# Patient Record
Sex: MALE | Race: BLACK OR AFRICAN AMERICAN | Employment: FULL TIME | URBAN - METROPOLITAN AREA
[De-identification: names, ages, dates, MRNs, and addresses within clinical notes are randomized per-mention and may not be internally consistent; named-entity substitution may affect disease eponyms.]

---

## 2019-11-16 ENCOUNTER — APPOINTMENT (OUTPATIENT)
Dept: GENERAL RADIOLOGY | Age: 29
End: 2019-11-16
Payer: COMMERCIAL

## 2019-11-16 ENCOUNTER — HOSPITAL ENCOUNTER (EMERGENCY)
Age: 29
Discharge: HOME | End: 2019-11-16
Attending: EMERGENCY MEDICINE
Payer: COMMERCIAL

## 2019-11-16 VITALS
DIASTOLIC BLOOD PRESSURE: 80 MMHG | TEMPERATURE: 98.1 F | OXYGEN SATURATION: 99 % | WEIGHT: 315 LBS | SYSTOLIC BLOOD PRESSURE: 149 MMHG | HEIGHT: 72 IN | BODY MASS INDEX: 42.66 KG/M2 | RESPIRATION RATE: 18 BRPM | HEART RATE: 84 BPM

## 2019-11-16 DIAGNOSIS — G89.29 CHRONIC RIGHT SHOULDER PAIN: Primary | ICD-10-CM

## 2019-11-16 DIAGNOSIS — M25.511 CHRONIC RIGHT SHOULDER PAIN: Primary | ICD-10-CM

## 2019-11-16 PROCEDURE — 99283 EMERGENCY DEPT VISIT LOW MDM: CPT

## 2019-11-16 PROCEDURE — 73030 X-RAY EXAM OF SHOULDER: CPT

## 2019-11-16 RX ORDER — OMEPRAZOLE 10 MG/1
10 CAPSULE, DELAYED RELEASE ORAL DAILY
COMMUNITY

## 2019-11-16 ASSESSMENT — PAIN DESCRIPTION - PAIN TYPE: TYPE: ACUTE PAIN

## 2019-11-16 ASSESSMENT — PAIN DESCRIPTION - ORIENTATION: ORIENTATION: RIGHT

## 2019-11-16 ASSESSMENT — PAIN DESCRIPTION - PROGRESSION: CLINICAL_PROGRESSION: GRADUALLY WORSENING

## 2019-11-16 ASSESSMENT — PAIN SCALES - GENERAL
PAINLEVEL_OUTOF10: 5
PAINLEVEL_OUTOF10: 5

## 2019-11-16 ASSESSMENT — PAIN DESCRIPTION - LOCATION: LOCATION: SHOULDER

## 2019-11-16 ASSESSMENT — PAIN DESCRIPTION - DESCRIPTORS: DESCRIPTORS: SHARP

## 2019-11-16 ASSESSMENT — PAIN DESCRIPTION - FREQUENCY: FREQUENCY: INTERMITTENT

## 2020-02-02 ENCOUNTER — HOSPITAL ENCOUNTER (EMERGENCY)
Age: 30
Discharge: HOME | End: 2020-02-02
Payer: COMMERCIAL

## 2020-02-02 VITALS
RESPIRATION RATE: 16 BRPM | WEIGHT: 315 LBS | HEIGHT: 72 IN | SYSTOLIC BLOOD PRESSURE: 144 MMHG | BODY MASS INDEX: 42.66 KG/M2 | DIASTOLIC BLOOD PRESSURE: 80 MMHG | OXYGEN SATURATION: 98 % | HEART RATE: 89 BPM | TEMPERATURE: 99.5 F

## 2020-02-02 LAB
ALBUMIN SERPL-MCNC: 3.6 G/DL (ref 3.4–5)
ALP BLD-CCNC: 45 U/L (ref 40–129)
ALT SERPL-CCNC: 21 U/L (ref 10–40)
ANION GAP SERPL CALCULATED.3IONS-SCNC: 14 MMOL/L (ref 3–16)
AST SERPL-CCNC: 21 U/L (ref 15–37)
BASOPHILS ABSOLUTE: 0 K/UL (ref 0–0.2)
BASOPHILS RELATIVE PERCENT: 0.6 %
BILIRUB SERPL-MCNC: 0.5 MG/DL (ref 0–1)
BILIRUBIN DIRECT: <0.2 MG/DL (ref 0–0.3)
BILIRUBIN, INDIRECT: ABNORMAL MG/DL (ref 0–1)
BUN BLDV-MCNC: 10 MG/DL (ref 7–20)
CALCIUM SERPL-MCNC: 9 MG/DL (ref 8.3–10.6)
CHLORIDE BLD-SCNC: 104 MMOL/L (ref 99–110)
CO2: 23 MMOL/L (ref 21–32)
CREAT SERPL-MCNC: 1 MG/DL (ref 0.9–1.3)
EOSINOPHILS ABSOLUTE: 0 K/UL (ref 0–0.6)
EOSINOPHILS RELATIVE PERCENT: 0.5 %
GFR AFRICAN AMERICAN: >60
GFR NON-AFRICAN AMERICAN: >60
GLUCOSE BLD-MCNC: 104 MG/DL (ref 70–99)
HCT VFR BLD CALC: 42.1 % (ref 40.5–52.5)
HEMOGLOBIN: 14.5 G/DL (ref 13.5–17.5)
LIPASE: 26 U/L (ref 13–60)
LYMPHOCYTES ABSOLUTE: 0.8 K/UL (ref 1–5.1)
LYMPHOCYTES RELATIVE PERCENT: 10.6 %
MCH RBC QN AUTO: 28.4 PG (ref 26–34)
MCHC RBC AUTO-ENTMCNC: 34.4 G/DL (ref 31–36)
MCV RBC AUTO: 82.6 FL (ref 80–100)
MONOCYTES ABSOLUTE: 0.9 K/UL (ref 0–1.3)
MONOCYTES RELATIVE PERCENT: 12.5 %
NEUTROPHILS ABSOLUTE: 5.7 K/UL (ref 1.7–7.7)
NEUTROPHILS RELATIVE PERCENT: 75.8 %
PDW BLD-RTO: 15.6 % (ref 12.4–15.4)
PLATELET # BLD: 228 K/UL (ref 135–450)
PMV BLD AUTO: 9.7 FL (ref 5–10.5)
POTASSIUM SERPL-SCNC: 3.6 MMOL/L (ref 3.5–5.1)
RAPID INFLUENZA  B AGN: POSITIVE
RAPID INFLUENZA A AGN: NEGATIVE
RBC # BLD: 5.1 M/UL (ref 4.2–5.9)
SODIUM BLD-SCNC: 141 MMOL/L (ref 136–145)
TOTAL PROTEIN: 6.2 G/DL (ref 6.4–8.2)
WBC # BLD: 7.5 K/UL (ref 4–11)

## 2020-02-02 PROCEDURE — 80048 BASIC METABOLIC PNL TOTAL CA: CPT

## 2020-02-02 PROCEDURE — 2580000003 HC RX 258: Performed by: EMERGENCY MEDICINE

## 2020-02-02 PROCEDURE — 99283 EMERGENCY DEPT VISIT LOW MDM: CPT

## 2020-02-02 PROCEDURE — 80076 HEPATIC FUNCTION PANEL: CPT

## 2020-02-02 PROCEDURE — 87804 INFLUENZA ASSAY W/OPTIC: CPT

## 2020-02-02 PROCEDURE — 85025 COMPLETE CBC W/AUTO DIFF WBC: CPT

## 2020-02-02 PROCEDURE — 83690 ASSAY OF LIPASE: CPT

## 2020-02-02 PROCEDURE — 96360 HYDRATION IV INFUSION INIT: CPT

## 2020-02-02 RX ORDER — OSELTAMIVIR PHOSPHATE 75 MG/1
75 CAPSULE ORAL ONCE
Status: DISCONTINUED | OUTPATIENT
Start: 2020-02-02 | End: 2020-02-03 | Stop reason: HOSPADM

## 2020-02-02 RX ORDER — 0.9 % SODIUM CHLORIDE 0.9 %
1000 INTRAVENOUS SOLUTION INTRAVENOUS ONCE
Status: COMPLETED | OUTPATIENT
Start: 2020-02-02 | End: 2020-02-02

## 2020-02-02 RX ORDER — OSELTAMIVIR PHOSPHATE 75 MG/1
75 CAPSULE ORAL 2 TIMES DAILY
Qty: 10 CAPSULE | Refills: 0 | Status: SHIPPED | OUTPATIENT
Start: 2020-02-02 | End: 2020-02-07

## 2020-02-02 RX ADMIN — SODIUM CHLORIDE 1000 ML: 0.9 INJECTION, SOLUTION INTRAVENOUS at 20:50

## 2020-02-02 ASSESSMENT — ENCOUNTER SYMPTOMS
ABDOMINAL PAIN: 0
NAUSEA: 1
VOMITING: 0
SHORTNESS OF BREATH: 0
BACK PAIN: 0
WHEEZING: 0
RHINORRHEA: 0
DIARRHEA: 0
COUGH: 0
PHOTOPHOBIA: 0

## 2020-02-03 NOTE — ED PROVIDER NOTES
Emergency Department Provider Note  Location: [address] [place] [address]  2/2/2020     Patient Identification  Rosario Guerra is a 26 y.o. male    Chief Complaint  Generalized Body Aches (Pt states he's having generalized flu like symptoms but is more concerned with having the Flu. States he's not urinating a lot and is concerned with dehydration since having Vertical Sleeve Gastr. surgery at [address] [facility] ([facility]) Dec 3rd. Pt currently still on soft foods. ) and Dehydration          HPI  (History provided by patient)  Patient is a 69-year-old male history of morbid obesity status post gastric sleeve reportedly performed by Dr. Han Espinoza approximately 6 weeks ago, who presents with concerns for dehydration and \"the flu\". Patient reports he is had generalized body aches over the past 2 or so days. Nausea but no vomiting no abdominal pain no back pain no diarrhea. Felt a little lightheaded when he stood up from seated position today. Subjective fever and chills. No urinary complaints. I have reviewed the following nursing documentation:  Allergies: Allergies   Allergen Reactions    Aspirin Nausea And Vomiting       Past medical history:  has no past medical history on file. Past surgical history:  has a past surgical history that includes Tonsillectomy and Adenoidectomy. Home medications:   Prior to Admission medications    Medication Sig Start Date End Date Taking? Authorizing Provider   omeprazole (PRILOSEC) 10 MG delayed release capsule Take 10 mg by mouth daily    Historical Provider, MD   meclizine (ANTIVERT) 25 MG tablet Take 1 tablet by mouth 3 times daily as needed for Dizziness WARNING: May cause drowsiness. May impair ability to operate a motor vehicle or machinery. Do not use in combination with alcohol. 3/7/17   LEA Sandoval, DO       Social history:  reports that he has never smoked. He has never used smokeless tobacco. He reports current alcohol use.  He reports that he does not use drugs. Family history:  No family history on file. ROS  Review of Systems   Constitutional: Positive for chills and fever. HENT: Negative for congestion and rhinorrhea. Eyes: Negative for photophobia and visual disturbance. Respiratory: Negative for cough, shortness of breath and wheezing. Cardiovascular: Negative for chest pain and palpitations. Gastrointestinal: Positive for nausea. Negative for abdominal pain, diarrhea and vomiting. Genitourinary: Negative for dysuria and hematuria. Musculoskeletal: Negative for back pain and neck pain. Skin: Negative for rash and wound. Neurological: Positive for light-headedness. Negative for syncope and weakness. Psychiatric/Behavioral: Negative for agitation and confusion. Exam  ED Triage Vitals [02/02/20 2004]   BP Temp Temp Source Pulse Resp SpO2 Height Weight   (!) 144/80 99.5 °F (37.5 °C) Oral 89 16 98 % 6' (1.829 m) (!) 334 lb 6 oz (151.7 kg)       Physical Exam  Vitals signs and nursing note reviewed. Constitutional:       General: He is not in acute distress. Appearance: He is well-developed. HENT:      Head: Normocephalic and atraumatic. Nose: Nose normal.      Mouth/Throat:      Mouth: Mucous membranes are moist.      Pharynx: Oropharynx is clear. Eyes:      Extraocular Movements: Extraocular movements intact. Pupils: Pupils are equal, round, and reactive to light. Neck:      Musculoskeletal: Normal range of motion and neck supple. Cardiovascular:      Rate and Rhythm: Normal rate and regular rhythm. Heart sounds: No murmur. Pulmonary:      Effort: Pulmonary effort is normal.      Breath sounds: Normal breath sounds. Abdominal:      General: There is no distension. Palpations: Abdomen is soft. Tenderness: There is no abdominal tenderness. Musculoskeletal: Normal range of motion. General: No deformity. Skin:     General: Skin is warm. Findings: No rash.    Neurological: 3.4 - 5.0 g/dL    Alkaline Phosphatase 45 40 - 129 U/L    ALT 21 10 - 40 U/L    AST 21 15 - 37 U/L    Total Bilirubin 0.5 0.0 - 1.0 mg/dL    Bilirubin, Direct <0.2 0.0 - 0.3 mg/dL    Bilirubin, Indirect see below 0.0 - 1.0 mg/dL         MDM  24-year-old male with recent gastric sleeve placed 6 weeks ago who presents with lightheadedness, myalgias subjective fever and chills. On exam well-appearing no acute distress, reassuring vital signs. Nonfocal and reassuring exam.  His symptoms sound consistent with viral illness. His labs show he is positive for influenza B. This correlates well with the symptoms. Otherwise he has no significant metabolic derangements. Given the fact that he has recent surgical history and is with an 48 definitely 72 hours of symptoms, discussed risks benefits and will prescribe Tamiflu. He can take and stop taking if he develops significant symptoms. Recommended follow-up with his PCP and close return precautions. At this point I do not feel the patient requires further work up and it is reasonable to discharge the patient. I had a discussion with the patient and/or their surrogate regarding diagnosis, diagnostic testing results, treatment/ plan of care, and follow up. There was shared decision-making between myself as well as the patient and/or their surrogate and we are all in agreement with discharge home. There was an opportunity for questions and all questions were answered to the best of my ability and to the satisfaction of the patient and/or patient family. Patient agreed to follow up with PCP for further evaluation/treatment. The patient was given strict return precautions as we discussed symptoms that would necessitate return to the ED. Patient will return to ED for new/worsening symptoms. The patient verbalized their understanding and agreement with the above plan. Please refer to AVS for further details regarding discharge instructions. Clinical Impression:  1. Influenza B          Disposition:  Discharge to home in good condition. Blood pressure (!) 144/80, pulse 89, temperature 99.5 °F (37.5 °C), temperature source Oral, resp. rate 16, height 6' (1.829 m), weight (!) 334 lb 6 oz (151.7 kg), SpO2 98 %. Patient was given scripts for the following medications. I counseled patient how to take these medications. Discharge Medication List as of 2/2/2020  9:58 PM      START taking these medications    Details   oseltamivir (TAMIFLU) 75 MG capsule Take 1 capsule by mouth 2 times daily for 5 days, Disp-10 capsule, R-0Print             Disposition referral (if applicable):  No follow-up provider specified. Total critical care time is 0 minutes, which excludes separately billable procedures and updating family. Time spent is specifically for management of the presenting complaint and symptoms initially, direct bedside care, reevaluation, review of records, and consultation. There was a high probability of clinically significant life-threatening deterioration in the patient's condition, which required my urgent intervention. This chart was generated in part by using Dragon Dictation system and may contain errors related to that system including errors in grammar, punctuation, and spelling, as well as words and phrases that may be inappropriate. If there are any questions or concerns please feel free to contact the dictating provider for clarification.      MD Ashish Maciel MD  02/02/20 [id number]       Shine Trimble MD  02/27/20 [id number]